# Patient Record
Sex: FEMALE | Race: WHITE | URBAN - METROPOLITAN AREA
[De-identification: names, ages, dates, MRNs, and addresses within clinical notes are randomized per-mention and may not be internally consistent; named-entity substitution may affect disease eponyms.]

---

## 2023-03-21 ENCOUNTER — OFFICE VISIT (OUTPATIENT)
Dept: URBAN - METROPOLITAN AREA CLINIC 8 | Facility: CLINIC | Age: 58
End: 2023-03-21

## 2023-03-21 DIAGNOSIS — H25.813 COMBINED FORMS OF AGE-RELATED CATARACT, BILATERAL: Primary | ICD-10-CM

## 2023-03-21 PROCEDURE — 99202 OFFICE O/P NEW SF 15 MIN: CPT | Performed by: OPHTHALMOLOGY

## 2023-03-21 ASSESSMENT — VISUAL ACUITY
OS: 20/20
OD: 20/20

## 2023-03-21 ASSESSMENT — INTRAOCULAR PRESSURE
OS: 17
OD: 17

## 2023-03-21 NOTE — IMPRESSION/PLAN
Impression: Combined forms of age-related cataract, bilateral: H25.813. Plan: Discussed cataract diagnosis with the patient. Patient may be candidate for surgery when more symptomatic. Mild OU. No treatment currently recommended. The patient will monitor vision changes and contact us with any decrease in vision. Patient elects to proceed with appointment for cataract measurements and SAMANTHA.

## 2023-04-04 ENCOUNTER — OFFICE VISIT (OUTPATIENT)
Dept: URBAN - METROPOLITAN AREA CLINIC 8 | Facility: CLINIC | Age: 58
End: 2023-04-04
Payer: COMMERCIAL

## 2023-04-04 DIAGNOSIS — H25.813 COMBINED FORMS OF AGE-RELATED CATARACT, BILATERAL: Primary | ICD-10-CM

## 2023-04-04 PROCEDURE — 92136 OPHTHALMIC BIOMETRY: CPT | Performed by: OPHTHALMOLOGY

## 2023-04-04 PROCEDURE — 99214 OFFICE O/P EST MOD 30 MIN: CPT | Performed by: OPHTHALMOLOGY

## 2023-04-04 ASSESSMENT — PACHYMETRY
OD: 3.34
OS: 25.74
OD: 25.70
OS: 3.10

## 2023-04-04 ASSESSMENT — INTRAOCULAR PRESSURE
OD: 16
OS: 16

## 2023-04-04 ASSESSMENT — VISUAL ACUITY
OS: 20/25
OD: 20/25

## 2023-04-04 NOTE — IMPRESSION/PLAN
Impression: Combined forms of age-related cataract, bilateral: H25.813. Plan: Discussed cataract surgery diagnosis with patient. Discussed risk and benefits Including infection, retinal detachment, droopy eye lid, swelling, bleeding, loss of vision and double vision. Patient understands will still need Rx glasses for best corrected vision. Recommend OD only. OD AIM:  PLANO Candidate for LenSx, ORA Patient elects to proceed with all standard IOL. IOL master, Verion, Perez and OCT were completed today.

## 2023-06-07 ENCOUNTER — PROCEDURE (OUTPATIENT)
Dept: URBAN - METROPOLITAN AREA SURGERY 8 | Facility: SURGERY | Age: 58
End: 2023-06-07
Payer: COMMERCIAL

## 2023-06-07 DIAGNOSIS — H52.223 REGULAR ASTIGMATISM, BILATERAL: ICD-10-CM

## 2023-06-07 DIAGNOSIS — H25.813 COMBINED FORMS OF AGE-RELATED CATARACT, BILATERAL: Primary | ICD-10-CM

## 2023-06-07 PROCEDURE — 66984 XCAPSL CTRC RMVL W/O ECP: CPT | Performed by: OPHTHALMOLOGY

## 2023-06-08 ENCOUNTER — POST-OPERATIVE VISIT (OUTPATIENT)
Dept: URBAN - METROPOLITAN AREA CLINIC 8 | Facility: CLINIC | Age: 58
End: 2023-06-08
Payer: COMMERCIAL

## 2023-06-08 DIAGNOSIS — Z48.810 ENCOUNTER FOR SURGICAL AFTERCARE FOLLOWING SURGERY ON A SENSE ORGAN: Primary | ICD-10-CM

## 2023-06-08 PROCEDURE — 99024 POSTOP FOLLOW-UP VISIT: CPT | Performed by: OPHTHALMOLOGY

## 2023-06-08 ASSESSMENT — INTRAOCULAR PRESSURE: OD: 17

## 2023-06-08 NOTE — IMPRESSION/PLAN
Impression: S/P Cataract Extraction by phacoemulsification with IOL placement; Femto (LenSx); ORA OD - 1 Day. Encounter for surgical aftercare following surgery on a sense organ  Z48.810. Plan: Patient is dong well. Advised to continue drops as directed, continue  Pred and Oflox OD. RTC in 1 week.

## 2023-06-13 ENCOUNTER — POST-OPERATIVE VISIT (OUTPATIENT)
Dept: URBAN - METROPOLITAN AREA CLINIC 8 | Facility: CLINIC | Age: 58
End: 2023-06-13
Payer: COMMERCIAL

## 2023-06-13 DIAGNOSIS — Z48.810 ENCOUNTER FOR SURGICAL AFTERCARE FOLLOWING SURGERY ON A SENSE ORGAN: Primary | ICD-10-CM

## 2023-06-13 DIAGNOSIS — H52.32 ANISEIKONIA: ICD-10-CM

## 2023-06-13 PROCEDURE — 99024 POSTOP FOLLOW-UP VISIT: CPT | Performed by: OPHTHALMOLOGY

## 2023-06-13 RX ORDER — DORZOLAMIDE HYDROCHLORIDE AND TIMOLOL MALEATE 20; 5 MG/ML; MG/ML
SOLUTION/ DROPS OPHTHALMIC
Qty: 10 | Refills: 0 | Status: INACTIVE
Start: 2023-06-13 | End: 2023-06-13

## 2023-06-13 ASSESSMENT — VISUAL ACUITY: OD: 20/20

## 2023-06-13 ASSESSMENT — INTRAOCULAR PRESSURE
OD: 41
OD: 25
OD: 38
OD: 14

## 2023-06-13 NOTE — IMPRESSION/PLAN
Impression: S/P Cataract Extraction by phacoemulsification with IOL placement; Femto (LenSx); ORA OD - 6 Days. Encounter for surgical aftercare following surgery on a sense organ  Z48.810. Plan: Discussed IOP being elevated today. Discussed drops with patient. Erx sent to pharmacy cosopt OD due to IOP Performed Paracentesis today in office IOP recheck 14 OD 

1 drop of Combigan in office today.  

RTC in 2 days to re-check IOP

## 2023-06-15 ENCOUNTER — POST-OPERATIVE VISIT (OUTPATIENT)
Dept: URBAN - METROPOLITAN AREA CLINIC 8 | Facility: CLINIC | Age: 58
End: 2023-06-15
Payer: COMMERCIAL

## 2023-06-15 PROCEDURE — 99024 POSTOP FOLLOW-UP VISIT: CPT | Performed by: OPHTHALMOLOGY

## 2023-06-15 RX ORDER — ACETAZOLAMIDE 250 MG/1
250 MG TABLET ORAL
Qty: 20 | Refills: 0 | Status: ACTIVE
Start: 2023-06-15

## 2023-06-15 ASSESSMENT — INTRAOCULAR PRESSURE
OD: 12
OD: 48
OD: 55

## 2023-06-15 NOTE — IMPRESSION/PLAN
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 Plan: Discussed treatment options with patient. Discussed risks and benefits and patient understands. 
IOL exchange in OD

## 2023-06-15 NOTE — IMPRESSION/PLAN
Impression: S/P Cataract Extraction by phacoemulsification with IOL placement; Femto (LenSx); ORA OD - 8 Days. Encounter for surgical aftercare following surgery on a sense organ  Z48.810. Plan: Patients IOP elevated today in office, Patient didnt start cosopt gtts until this morning. Discussed with patient will need to Perform Paracentesis today again due to high IOP. Performed Paracentesis today in office IOP Dr. Gina Mendosa 12 OD

install gtts Tetracaine OS, Gave patient cosopt generic gtts in office today. ERx sent to pharmacy Acetazolamide 250mg  #20 1/2 tab 4x a day. Patient given Munoz Moder in office today 1 drop at bed time.  

RTC in 1 day for IOP check with Dr. Yves Weeks in AM

## 2023-06-16 ENCOUNTER — POST-OPERATIVE VISIT (OUTPATIENT)
Dept: URBAN - METROPOLITAN AREA CLINIC 8 | Facility: CLINIC | Age: 58
End: 2023-06-16
Payer: COMMERCIAL

## 2023-06-16 PROCEDURE — 99024 POSTOP FOLLOW-UP VISIT: CPT

## 2023-06-16 RX ORDER — NETARSUDIL AND LATANOPROST OPHTHALMIC SOLUTION, 0.02%/0.005% .2; .05 MG/ML; MG/ML
SOLUTION/ DROPS OPHTHALMIC; TOPICAL
Qty: 0 | Refills: 0 | Status: ACTIVE
Start: 2023-06-15

## 2023-06-16 ASSESSMENT — INTRAOCULAR PRESSURE
OD: 12
OD: 15

## 2023-06-16 NOTE — IMPRESSION/PLAN
Impression: S/P Cataract Extraction by phacoemulsification with IOL placement; Femto (LenSx); ORA OD - 9 Days. Encounter for surgical aftercare following surgery on a sense organ  Z48.810. Plan: Patients IOP are stable today. Continue medications as listed. Return to [facility] for IOL exchange OD.

## 2023-06-21 ENCOUNTER — PROCEDURE (OUTPATIENT)
Dept: URBAN - METROPOLITAN AREA SURGERY 8 | Facility: SURGERY | Age: 58
End: 2023-06-21
Payer: COMMERCIAL

## 2023-06-21 PROCEDURE — 66986 EXCHANGE LENS PROSTHESIS: CPT | Performed by: OPHTHALMOLOGY

## 2023-06-22 ENCOUNTER — POST-OPERATIVE VISIT (OUTPATIENT)
Dept: URBAN - METROPOLITAN AREA CLINIC 8 | Facility: CLINIC | Age: 58
End: 2023-06-22
Payer: COMMERCIAL

## 2023-06-22 DIAGNOSIS — Z48.810 ENCOUNTER FOR SURGICAL AFTERCARE FOLLOWING SURGERY ON A SENSE ORGAN: Primary | ICD-10-CM

## 2023-06-22 PROCEDURE — 99024 POSTOP FOLLOW-UP VISIT: CPT | Performed by: OPHTHALMOLOGY

## 2023-06-22 ASSESSMENT — INTRAOCULAR PRESSURE: OD: 21

## 2023-06-22 NOTE — IMPRESSION/PLAN
Impression: S/P Exchange of Intraocular Lens OD - 1 Day. Encounter for surgical aftercare following surgery on a sense organ  Z48.810. Plan: continue drops and return in one week. call us prn any concerns.  Continue Pred, Olfoxacin QID OD

## 2023-06-27 ENCOUNTER — POST-OPERATIVE VISIT (OUTPATIENT)
Dept: URBAN - METROPOLITAN AREA CLINIC 8 | Facility: CLINIC | Age: 58
End: 2023-06-27
Payer: COMMERCIAL

## 2023-06-27 DIAGNOSIS — Z48.810 ENCOUNTER FOR SURGICAL AFTERCARE FOLLOWING SURGERY ON A SENSE ORGAN: Primary | ICD-10-CM

## 2023-06-27 PROCEDURE — 99024 POSTOP FOLLOW-UP VISIT: CPT | Performed by: OPHTHALMOLOGY

## 2023-06-27 ASSESSMENT — VISUAL ACUITY: OD: 20/20

## 2023-06-27 ASSESSMENT — INTRAOCULAR PRESSURE: OD: 37

## 2023-06-27 NOTE — IMPRESSION/PLAN
Impression: S/P Exchange of Intraocular Lens OD - 6 Days. Encounter for surgical aftercare following surgery on a sense organ  Z48.810. Plan: Patient is doing well. Discussed gtts Continue Rocklatan 1x a day , [id number] a day  Ketoralac 4x a day OD. Discontinue Oral Medication. Ofloxacin continue left eye. RTC in 1 week for follow up.

## 2023-07-11 ENCOUNTER — POST-OPERATIVE VISIT (OUTPATIENT)
Dept: URBAN - METROPOLITAN AREA CLINIC 8 | Facility: CLINIC | Age: 58
End: 2023-07-11
Payer: COMMERCIAL

## 2023-07-11 DIAGNOSIS — Z48.810 ENCOUNTER FOR SURGICAL AFTERCARE FOLLOWING SURGERY ON A SENSE ORGAN: Primary | ICD-10-CM

## 2023-07-11 PROCEDURE — 99024 POSTOP FOLLOW-UP VISIT: CPT | Performed by: OPHTHALMOLOGY

## 2023-07-11 ASSESSMENT — INTRAOCULAR PRESSURE
OD: 12
OS: 14

## 2023-07-11 ASSESSMENT — VISUAL ACUITY: OD: 20/25

## 2023-07-11 NOTE — IMPRESSION/PLAN
Impression: S/P Exchange of Intraocular Lens OD - 20 Days. Encounter for surgical aftercare following surgery on a sense organ  Z48.810. Plan: Stop rocklatan and dorzolamide OD, continue ketoralac qid OD
and stop All drops OS.  
next Visit final RX for glasses

## 2023-07-24 ENCOUNTER — POST-OPERATIVE VISIT (OUTPATIENT)
Dept: URBAN - METROPOLITAN AREA CLINIC 8 | Facility: CLINIC | Age: 58
End: 2023-07-24
Payer: COMMERCIAL

## 2023-07-24 DIAGNOSIS — Z48.810 ENCOUNTER FOR SURGICAL AFTERCARE FOLLOWING SURGERY ON A SENSE ORGAN: Primary | ICD-10-CM

## 2023-07-24 DIAGNOSIS — H52.4 PRESBYOPIA: ICD-10-CM

## 2023-07-24 PROCEDURE — 92015 DETERMINE REFRACTIVE STATE: CPT

## 2023-07-24 PROCEDURE — 99024 POSTOP FOLLOW-UP VISIT: CPT

## 2023-07-24 ASSESSMENT — VISUAL ACUITY
OD: 20/20
OS: 20/20

## 2023-07-24 ASSESSMENT — INTRAOCULAR PRESSURE: OD: 16

## 2024-06-21 ENCOUNTER — OFFICE VISIT (OUTPATIENT)
Dept: URBAN - METROPOLITAN AREA CLINIC 8 | Facility: CLINIC | Age: 59
End: 2024-06-21
Payer: COMMERCIAL

## 2024-06-21 DIAGNOSIS — Z96.1 PRESENCE OF INTRAOCULAR LENS: ICD-10-CM

## 2024-06-21 DIAGNOSIS — H25.812 COMBINED FORMS OF AGE-RELATED CATARACT, LEFT EYE: Primary | ICD-10-CM

## 2024-06-21 DIAGNOSIS — H52.4 PRESBYOPIA: ICD-10-CM

## 2024-06-21 PROCEDURE — 99214 OFFICE O/P EST MOD 30 MIN: CPT

## 2024-06-21 ASSESSMENT — VISUAL ACUITY
OS: 20/20
OD: 20/20